# Patient Record
Sex: FEMALE | URBAN - METROPOLITAN AREA
[De-identification: names, ages, dates, MRNs, and addresses within clinical notes are randomized per-mention and may not be internally consistent; named-entity substitution may affect disease eponyms.]

---

## 2022-02-05 ENCOUNTER — HOSPITAL ENCOUNTER (EMERGENCY)
Facility: OTHER | Age: 27
Discharge: HOME | End: 2022-02-05
Attending: EMERGENCY MEDICINE

## 2022-02-05 VITALS
TEMPERATURE: 98 F | WEIGHT: 135 LBS | HEIGHT: 68 IN | DIASTOLIC BLOOD PRESSURE: 73 MMHG | RESPIRATION RATE: 18 BRPM | BODY MASS INDEX: 20.46 KG/M2 | OXYGEN SATURATION: 100 % | HEART RATE: 81 BPM | SYSTOLIC BLOOD PRESSURE: 121 MMHG

## 2022-02-05 DIAGNOSIS — R55 NEAR SYNCOPE: ICD-10-CM

## 2022-02-05 DIAGNOSIS — F10.920 ALCOHOLIC INTOXICATION WITHOUT COMPLICATION: Primary | ICD-10-CM

## 2022-02-05 LAB — POCT GLUCOSE: 77 MG/DL (ref 70–110)

## 2022-02-05 PROCEDURE — 93010 EKG 12-LEAD: ICD-10-PCS | Mod: ,,, | Performed by: INTERNAL MEDICINE

## 2022-02-05 PROCEDURE — 99283 EMERGENCY DEPT VISIT LOW MDM: CPT | Mod: 25

## 2022-02-05 PROCEDURE — 93005 ELECTROCARDIOGRAM TRACING: CPT

## 2022-02-05 PROCEDURE — 93010 ELECTROCARDIOGRAM REPORT: CPT | Mod: ,,, | Performed by: INTERNAL MEDICINE

## 2022-02-05 PROCEDURE — 36000 PLACE NEEDLE IN VEIN: CPT

## 2022-02-05 PROCEDURE — 82962 GLUCOSE BLOOD TEST: CPT

## 2022-02-05 RX ORDER — SODIUM CHLORIDE 9 MG/ML
INJECTION, SOLUTION INTRAVENOUS
Status: DISCONTINUED | OUTPATIENT
Start: 2022-02-05 | End: 2022-02-05

## 2022-02-06 NOTE — ED PROVIDER NOTES
Encounter Date: 2/5/2022    SCRIBE #1 NOTE: I, Ida Day, am scribing for, and in the presence of, Dr. Collazo.       History     Chief Complaint   Patient presents with    Loss of Consciousness     Near syncopal episode at parade route with ETOH on board. Nausea and vomiting one time.      Time seen by provider: 9:48 PM    This is a 26 y.o. female who presents with complaint of near syncope PTA. The patient was carried off the parade route when she felt near syncope, dizzy, and lightheaded. She has not eaten today or taken anything besides alcohol. Her boyfriend says she had a denisse. She had 1 episode of vomiting per EMS. She is not nauseated at this time and states she feels much better. No other complaints.     The history is provided by the patient and the EMS personnel.     Review of patient's allergies indicates:  No Known Allergies  No past medical history on file.  No past surgical history on file.  No family history on file.     Review of Systems   Constitutional: Negative for fever.   HENT: Negative for sore throat.    Respiratory: Negative for shortness of breath.    Cardiovascular: Negative for chest pain.   Gastrointestinal: Positive for nausea (resolved) and vomiting.   Genitourinary: Negative for dysuria.   Musculoskeletal: Negative for back pain.   Skin: Negative for rash.   Neurological: Positive for dizziness, syncope (near) and light-headedness. Negative for weakness.   Hematological: Does not bruise/bleed easily.       Physical Exam     Initial Vitals   BP Pulse Resp Temp SpO2   02/05/22 2147 02/05/22 2147 02/05/22 2216 02/05/22 2216 02/05/22 2147   120/75 73 18 97.7 °F (36.5 °C) 100 %      MAP       --                Physical Exam    Nursing note and vitals reviewed.  Constitutional: She appears well-developed and well-nourished. She is cooperative.  Non-toxic appearance. No distress.   HENT:   Head: Normocephalic and atraumatic.   Eyes: Conjunctivae and EOM are normal. Pupils are equal,  round, and reactive to light.   Neck: Neck supple.   Normal range of motion.   Full passive range of motion without pain.     Cardiovascular: Normal rate, regular rhythm, normal heart sounds and normal pulses.   Pulmonary/Chest: Effort normal and breath sounds normal. No respiratory distress.   Abdominal: Abdomen is soft. Bowel sounds are normal. She exhibits no distension. There is no abdominal tenderness.   Musculoskeletal:         General: Normal range of motion.      Cervical back: Full passive range of motion without pain, normal range of motion and neck supple.     Neurological: She is alert and oriented to person, place, and time. She has normal strength. No cranial nerve deficit or sensory deficit. GCS score is 15. GCS eye subscore is 4. GCS verbal subscore is 5. GCS motor subscore is 6.   Skin: Skin is warm, dry and intact. No rash noted.   Psychiatric: She has a normal mood and affect. Her speech is normal and behavior is normal. Judgment and thought content normal.         ED Course   Procedures  Labs Reviewed   POCT GLUCOSE   POCT GLUCOSE MONITORING CONTINUOUS     EKG Readings: (Independently Interpreted)   Normal sinus rhythm. Rate of 76. Motion artifact present. No ST or T wave changes.         Imaging Results    None          Medications   0.9%  NaCl infusion (has no administration in time range)     Medical Decision Making:   History:   Old Medical Records: I decided to obtain old medical records.  Independently Interpreted Test(s):   I have ordered and independently interpreted EKG Reading(s) - see prior notes  Clinical Tests:   Lab Tests: Ordered and Reviewed  Medical Tests: Ordered and Reviewed          Scribe Attestation:   Scribe #1: I performed the above scribed service and the documentation accurately describes the services I performed. I attest to the accuracy of the note.        ED Course as of 02/05/22 2242   Sat Feb 05, 2022   2154 27 y/o female BIB EMS 2/2 near syncope at the [facility] route  tonight.  Pt currently asymptomatic.  She admits to drinking alcohol today with little other po intake.  Exam and VS WNL.  Will observe on cardiac monitor and obtain EKG and BG.   [AA]   2238 EKG unremarkable.  BG low normal.  Pt given food which she tolerated.  She remains asymptomatic.  Sober visitor at bedside.  Pt comfortable being discharged home at this time in his care.   [AA]      ED Course User Index  [AA] Frieda Collazo MD           Physician Attestation for scribe, I Frieda Collazo  , reviewed documentation as scribed in my presence, which is both accurate and complete.    Clinical Impression:     1. Alcoholic intoxication without complication    2. Near syncope         ED Disposition Condition    Discharge Stable        ED Prescriptions     None        Follow-up Information     Follow up With Specialties Details Why Contact Info    [facility] Dept Emergency Medicine Go to  If symptoms worsen [address]  [facility] [id number] [phone]    your primary care doctor  Schedule an appointment as soon as possible for a visit  As needed            Frieda Collazo MD  02/05/22 8244